# Patient Record
Sex: MALE | Race: WHITE | URBAN - METROPOLITAN AREA
[De-identification: names, ages, dates, MRNs, and addresses within clinical notes are randomized per-mention and may not be internally consistent; named-entity substitution may affect disease eponyms.]

---

## 2017-01-03 ENCOUNTER — APPOINTMENT (OUTPATIENT)
Dept: GENERAL RADIOLOGY | Facility: CLINIC | Age: 82
End: 2017-01-03
Attending: UROLOGY
Payer: MEDICARE

## 2017-01-03 ENCOUNTER — ANESTHESIA EVENT (OUTPATIENT)
Dept: SURGERY | Facility: CLINIC | Age: 82
End: 2017-01-03
Payer: MEDICARE

## 2017-01-03 ENCOUNTER — ANESTHESIA (OUTPATIENT)
Dept: SURGERY | Facility: CLINIC | Age: 82
End: 2017-01-03
Payer: MEDICARE

## 2017-01-03 PROBLEM — C66.9 URETERAL CANCER (H): Status: ACTIVE | Noted: 2017-01-03

## 2017-01-03 PROCEDURE — 25000125 ZZHC RX 250: Performed by: UROLOGY

## 2017-01-03 PROCEDURE — 25000128 H RX IP 250 OP 636: Performed by: NURSE ANESTHETIST, CERTIFIED REGISTERED

## 2017-01-03 PROCEDURE — C9399 UNCLASSIFIED DRUGS OR BIOLOG: HCPCS | Performed by: NURSE ANESTHETIST, CERTIFIED REGISTERED

## 2017-01-03 PROCEDURE — 40000277 XR SURGERY CARM FLUORO LESS THAN 5 MIN W STILLS: Mod: TC

## 2017-01-03 PROCEDURE — 25000125 ZZHC RX 250: Performed by: NURSE ANESTHETIST, CERTIFIED REGISTERED

## 2017-01-03 PROCEDURE — 25800025 ZZH RX 258: Performed by: NURSE ANESTHETIST, CERTIFIED REGISTERED

## 2017-01-03 PROCEDURE — P9041 ALBUMIN (HUMAN),5%, 50ML: HCPCS | Performed by: NURSE ANESTHETIST, CERTIFIED REGISTERED

## 2017-01-03 RX ORDER — PROPOFOL 10 MG/ML
INJECTION, EMULSION INTRAVENOUS PRN
Status: DISCONTINUED | OUTPATIENT
Start: 2017-01-03 | End: 2017-01-03

## 2017-01-03 RX ORDER — SODIUM CHLORIDE, SODIUM LACTATE, POTASSIUM CHLORIDE, CALCIUM CHLORIDE 600; 310; 30; 20 MG/100ML; MG/100ML; MG/100ML; MG/100ML
INJECTION, SOLUTION INTRAVENOUS CONTINUOUS PRN
Status: DISCONTINUED | OUTPATIENT
Start: 2017-01-03 | End: 2017-01-03

## 2017-01-03 RX ORDER — LIDOCAINE HYDROCHLORIDE 20 MG/ML
INJECTION, SOLUTION INFILTRATION; PERINEURAL PRN
Status: DISCONTINUED | OUTPATIENT
Start: 2017-01-03 | End: 2017-01-03

## 2017-01-03 RX ORDER — ALBUMIN, HUMAN INJ 5% 5 %
SOLUTION INTRAVENOUS CONTINUOUS PRN
Status: DISCONTINUED | OUTPATIENT
Start: 2017-01-03 | End: 2017-01-03

## 2017-01-03 RX ORDER — ONDANSETRON 2 MG/ML
INJECTION INTRAMUSCULAR; INTRAVENOUS PRN
Status: DISCONTINUED | OUTPATIENT
Start: 2017-01-03 | End: 2017-01-03

## 2017-01-03 RX ORDER — FENTANYL CITRATE 50 UG/ML
INJECTION, SOLUTION INTRAMUSCULAR; INTRAVENOUS PRN
Status: DISCONTINUED | OUTPATIENT
Start: 2017-01-03 | End: 2017-01-03

## 2017-01-03 RX ADMIN — LIDOCAINE HYDROCHLORIDE 90 MG: 20 INJECTION, SOLUTION INFILTRATION; PERINEURAL at 14:13

## 2017-01-03 RX ADMIN — SODIUM CHLORIDE, POTASSIUM CHLORIDE, SODIUM LACTATE AND CALCIUM CHLORIDE: 600; 310; 30; 20 INJECTION, SOLUTION INTRAVENOUS at 14:00

## 2017-01-03 RX ADMIN — PROPOFOL 10 MG: 10 INJECTION, EMULSION INTRAVENOUS at 18:02

## 2017-01-03 RX ADMIN — SODIUM CHLORIDE, POTASSIUM CHLORIDE, SODIUM LACTATE AND CALCIUM CHLORIDE: 600; 310; 30; 20 INJECTION, SOLUTION INTRAVENOUS at 14:15

## 2017-01-03 RX ADMIN — PROPOFOL 20 MG: 10 INJECTION, EMULSION INTRAVENOUS at 17:58

## 2017-01-03 RX ADMIN — FENTANYL CITRATE 150 MCG: 50 INJECTION, SOLUTION INTRAMUSCULAR; INTRAVENOUS at 14:13

## 2017-01-03 RX ADMIN — CEFAZOLIN SODIUM 2 G: 2 INJECTION, SOLUTION INTRAVENOUS at 14:39

## 2017-01-03 RX ADMIN — PROPOFOL 10 MG: 10 INJECTION, EMULSION INTRAVENOUS at 18:01

## 2017-01-03 RX ADMIN — SUGAMMADEX 140 MG: 100 INJECTION, SOLUTION INTRAVENOUS at 17:28

## 2017-01-03 RX ADMIN — ONDANSETRON 4 MG: 2 INJECTION INTRAMUSCULAR; INTRAVENOUS at 17:27

## 2017-01-03 RX ADMIN — FENTANYL CITRATE 50 MCG: 50 INJECTION, SOLUTION INTRAMUSCULAR; INTRAVENOUS at 17:00

## 2017-01-03 RX ADMIN — FENTANYL CITRATE 25 MCG: 50 INJECTION, SOLUTION INTRAMUSCULAR; INTRAVENOUS at 15:00

## 2017-01-03 RX ADMIN — PROPOFOL 140 MG: 10 INJECTION, EMULSION INTRAVENOUS at 14:13

## 2017-01-03 RX ADMIN — PROPOFOL 10 MG: 10 INJECTION, EMULSION INTRAVENOUS at 18:00

## 2017-01-03 RX ADMIN — ROCURONIUM BROMIDE 100 MG: 10 INJECTION INTRAVENOUS at 14:13

## 2017-01-03 RX ADMIN — PHENYLEPHRINE HYDROCHLORIDE 100 MCG: 10 INJECTION, SOLUTION INTRAMUSCULAR; INTRAVENOUS; SUBCUTANEOUS at 14:36

## 2017-01-03 RX ADMIN — PHENYLEPHRINE HYDROCHLORIDE 100 MCG: 10 INJECTION, SOLUTION INTRAMUSCULAR; INTRAVENOUS; SUBCUTANEOUS at 14:20

## 2017-01-03 RX ADMIN — ALBUMIN (HUMAN): 12.5 SOLUTION INTRAVENOUS at 14:20

## 2017-01-03 RX ADMIN — FENTANYL CITRATE 25 MCG: 50 INJECTION, SOLUTION INTRAMUSCULAR; INTRAVENOUS at 15:38

## 2017-01-03 ASSESSMENT — ENCOUNTER SYMPTOMS: DYSRHYTHMIAS: 1

## 2017-01-03 NOTE — ANESTHESIA PROCEDURE NOTES
Arterial Line Procedure Note  Staff:     Anesthesiologist:  CHANTALE STRICKLAND    Resident/CRNA:  ABENA CM    Arterial line performed by resident/CRNA in presence of a teaching physician    Location: In OR After Induction  Procedure Start/Stop Times:     patient identified, IV checked, site marked, risks and benefits discussed, informed consent, monitors and equipment checked, pre-op evaluation and at physician/surgeon's request      Correct Patient: Yes      Correct Position: Yes      Correct Site: Yes      Correct Procedure: Yes      Correct Laterality:  Yes    Site Marked:  N/A  Line Placement:     Procedure:  Arterial Line    Insertion Site:  Radial    Insertion laterality:  Right    Skin Prep: Chloraprep      Patient Prep: patient draped, mask, sterile gloves, hat and hand hygiene      Local skin infiltration:  None    Ultrasound Guided?: No      Catheter size:  20 gauge, Quick cath    Cath secured with: suture      Dressing:  Tegaderm    Complications:  None obvious    Arterial waveform: Yes      IBP within 10% of NIBP: Yes

## 2017-01-03 NOTE — ANESTHESIA PREPROCEDURE EVALUATION
Anesthesia Evaluation     . Pt has had prior anesthetic. Type: General    No history of anesthetic complications     ROS/MED HX    ENT/Pulmonary:  - neg pulmonary ROS     Neurologic:  - neg neurologic ROS     Cardiovascular:     (+) hypertension----. : . CHF etiology: diastolic dysfunction . HIGH, . :. dysrhythmias (paroxysmal AFib; not anticoagulated due to hematuria) .       METS/Exercise Tolerance:  >4 METS   Hematologic:         Musculoskeletal:         GI/Hepatic:  - neg GI/hepatic ROS       Renal/Genitourinary:     (+) chronic renal disease, type: CRI, Pt does not require dialysis,       Endo:     (+) type II DM Last HgA1c: 7.5 date: 7/2016 .      Psychiatric:  - neg psychiatric ROS       Infectious Disease:         Malignancy:   (+) Malignancy (ureteral cancer)           Other:               Physical Exam      Airway   Mallampati: II  TM distance: >3 FB  Neck ROM: limited    Dental   Comment: Edentulous on top    Cardiovascular   Rhythm and rate: irregular and normal      Pulmonary (+) decreased breath sounds                       Anesthesia Plan      History & Physical Review      ASA Status:  3 .    NPO Status:  > 6 hours    Plan for General and ETT with Intravenous and Propofol induction. Maintenance will be Balanced.    PONV prophylaxis:  Ondansetron (or other 5HT-3)  Additional equipment: 2nd IV and Arterial Line      Postoperative Care  Postoperative pain management:  IV analgesics and Multi-modal analgesia.      Consents  Anesthetic plan, risks, benefits and alternatives discussed with:  Patient..        82M with HTN, HLD, DM and diastolic dysfunction here for resection of ureteral cancer.    ASA 3.  Plan: GETA, modified RSI, PIV x2, art line for diastolic dysfunction.    Solange Blanco MD  Staff Anesthesiologist  Pager [phone]

## 2017-01-04 NOTE — ANESTHESIA POSTPROCEDURE EVALUATION
Patient: Osmani Mckay    DAVINCI URETERECTOMY (Right Abdomen)  COMBINED CYSTOSCOPY, RETROGRADES, URETEROSCOPY, INSERT STENT (Left Urethra)  Additional InformationProcedure(s):  DaVinci Assisted Right pelvic lymphadenectomy, Cystoscopy, Left ureteral washings, Left pyelogram retrograde, Right diagnostic ureteroscopy, Right ureteral stent removal, right stent placement, Cystogram *Latex Allergy* - Wound Class: II-Clean Contaminated   - Wound Class: II-Clean Contaminated    Diagnosis:Urothelial Cancer   Diagnosis Additional Information: No value filed.    Anesthesia Type:  General, ETT    Note:  Anesthesia Post Evaluation    Patient location during evaluation: PACU  Patient participation: Able to fully participate in evaluation  Level of consciousness: awake  Pain management: adequate  Airway patency: patent  Cardiovascular status: hemodynamically stable  Respiratory status: room air  Hydration status: euvolemic  PONV: none             Last vitals:  Filed Vitals:    01/03/17 1900 01/03/17 1915 01/03/17 1930   BP: 121/92 114/77 113/65   Temp: 36.6  C (97.8  F)  36.9  C (98.4  F)   Resp: 16 16 16   SpO2: 100% 100% 100%       Electronically Signed By: Tc Dubois MD  January 3, 2017  7:39 PM

## 2017-01-04 NOTE — ANESTHESIA CARE TRANSFER NOTE
Patient: Osmani Mckay    DAVINCI URETERECTOMY (Right Abdomen)  COMBINED CYSTOSCOPY, RETROGRADES, URETEROSCOPY, INSERT STENT (Left Urethra)  Additional InformationProcedure(s):  DaVinci Assisted Right pelvic lymphadenectomy, Cystoscopy, Left ureteral washings, Left pyelogram retrograde, Right diagnostic ureteroscopy, Right ureteral stent removal, right stent placement, Cystogram *Latex Allergy* - Wound Class: II-Clean Contaminated   - Wound Class: II-Clean Contaminated    Diagnosis: Urothelial Cancer   Diagnosis Additional Information: No value filed.    Anesthesia Type:   General, ETT     Note:  Airway :Face Mask  Patient transferred to:PACU  Comments: Sleepy, awakens to verbal stimuli. Stable, report to RN.      Vitals: (Last set prior to Anesthesia Care Transfer)              Electronically Signed By: JEANE Smith CRNA  January 3, 2017  6:22 PM

## 2017-01-05 ENCOUNTER — CARE COORDINATION (OUTPATIENT)
Dept: CARE COORDINATION | Facility: CLINIC | Age: 82
End: 2017-01-05

## 2017-01-05 NOTE — PROGRESS NOTES
"[facility]  \"Hello, my name is Marisol Gamez , and I am calling from the [facility].  I want to check in and see how you are doing, after leaving the hospital.  You will also receive a call from your Care Coordinator (care team), but I want to make sure you don t have any urgent needs.  I have a couple questions to review with you:     Post-Discharge Outreach                                                    Osmani Mckay is a 82 year old male     Date of Admission:                 1/3/2017    Date of Discharge::                 1/5/2017  Admitting Physician:               Eugenio Francisco MD  Discharge Physician:              Divine Bauer PA-C              Admission Diagnoses:    Urothelial Cancer        Past Medical History     Past Medical History    Diagnosis  Date       Arrhythmia          A-FIB       Chronic diastolic CHF (congestive heart failure) (H)         Urethral carcinoma (H)         Hypertension         Numbness and tingling          PERIPHERAL NEUROPATHY       Glucose intolerance (impaired glucose tolerance)         Renal disease          CHRONIC, STAGE III, GFR 30-59 ML/MIN       Peripheral vascular disease (H)         Arthritis          OSTEOARTHRITIS       Melanoma (H)         High cholesterol         Spinal stenosis         BPH (benign prostatic hyperplasia)         Diabetes (H)                    Discharge Diagnosis:    Urothelial Cancer                Follow-up Appointments      Adult [facility] Follow-up and recommended labs and tests        Follow-Up:    - Call your primary care provider to touch base regarding your recent admission.     - Follow up with Dr. Francisco as scheduled on 1/19/16 to review pathology and discuss plan.    - Call or return sooner than your regularly scheduled visit if you develop any of the following:  Fever (greater than 101.3F) or flu-like symptoms, uncontrolled pain, uncontrolled nausea or vomiting, as well as " increased redness, swelling, or drainage from your wound.     Phone numbers:  - Nursing phone helpline at the Urology Clinic (8A-5P M-F):  [phone].     - Nights or weekends, call [phone] and ask the  to page the urology resident on call.    - For emergencies, always call 911        Appointments on [place] and/or [facility] (with [facility] or [facility] provider or service). Call [phone] if you haven't heard regarding these appointments within 7 days of discharge.                       Your next 10 appointments already scheduled      Jan 19, 2017  1:45 PM   (Arrive by 1:30 PM)   Post-Op with Eugenio Francisco MD   [facility] and [facility] ([facility])               Care Team:    Patient Care Team       Relationship Specialty Notifications Start Kenan Dixon PCP - General Internal Medicine  12/27/16     Phone: [phone] Fax: [phone]         [facility] [address] [facility] [address]    Eugenio Francisco MD MD Urology  11/29/16     Phone: [phone] Fax: [phone]         [address] [address]    Chani Manzanares RN Registered Nurse Urology  11/29/16     Phone: [phone] Pager: [phone]        Crow Rivera MD Referring Physician   11/29/16             Transition of Care Review                                                      Did you have a surgery or procedure during your hospital visit? Yes   If yes, do you have any of the following:     Signs of infection:  No    Pain:  No     Pain Scale (0-10) 0/10     Location: NA    Wound/incision concerns? NO    Do you have all of your medications/refills?  Yes    Are you having any side effects or questions about your medication(s)? No    Do you have any new or worsening symptoms?  No    Do you have any future appointments scheduled?   Yes  1/19/17             Plan                                                       Thanks for your time.  Your Care Coordinator will follow-up within the next couple days.  In the meantime if you have questions, concerns or problems call your care team.        Marisol Gamez

## 2017-01-09 ENCOUNTER — PRE VISIT (OUTPATIENT)
Dept: UROLOGY | Facility: CLINIC | Age: 82
End: 2017-01-09

## 2017-01-19 ENCOUNTER — OFFICE VISIT (OUTPATIENT)
Dept: UROLOGY | Facility: CLINIC | Age: 82
End: 2017-01-19

## 2017-01-19 VITALS
SYSTOLIC BLOOD PRESSURE: 115 MMHG | WEIGHT: 151 LBS | HEIGHT: 68 IN | DIASTOLIC BLOOD PRESSURE: 69 MMHG | HEART RATE: 71 BPM | BODY MASS INDEX: 22.88 KG/M2

## 2017-01-19 DIAGNOSIS — C66.1 MALIGNANT TUMOR OF RIGHT URETER (H): ICD-10-CM

## 2017-01-19 DIAGNOSIS — C67.8 MALIGNANT NEOPLASM OF OVERLAPPING SITES OF BLADDER (H): ICD-10-CM

## 2017-01-19 DIAGNOSIS — C68.9 UROTHELIAL CANCER (H): Primary | ICD-10-CM

## 2017-01-19 ASSESSMENT — PAIN SCALES - GENERAL: PAINLEVEL: NO PAIN (0)

## 2017-01-19 NOTE — PROGRESS NOTES
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     The surgery was aborted due to pathologic evidence of lymph node disease. Left ureteral washings were negative.     BLADDER  Date of Initial Diagnosis:  10/2016  Stage of Initial Diagnosis: CIS  Grade at Initial Diagnosis: high  Site of First Diagnosis: bladder    Any Recurrence?: none    Intravesical Treatments: TURBT  Date of Last Cysto: 2017 with no evidence of disease   Date of Last Upper Tract Imagin/3/2017 (retrograde pyelogram)    Right Distal Ureter  Date of Initial Diagnosis:  10/2016  Stage of Initial Diagnosis: High grade possibly invasive  Grade at Initial Diagnosis: High  Site of First Diagnosis: Right distal ureter, unknown if upper tract okay  Any Recurrence?: n/a  Intravesical Treatments: biopsy and fulgaration  Date of Last Cysto: 2017  Date of Last Upper Tract Imagin/3/2017 (retrograde pyelogram)     Left  Ureteral washings  Negative on repeat washing, and normal retrograde (still at risk)    CR     2.03   2017  CR     2.03   2017  CR     2.05   1/3/2017  CR     2.16   1/3/2017      Assessment: History of Urothelial Cancer stage Right distal ureter: pT?(likely 3) N2Mx    Plan:  - Will see Dr. Diaz in 3 months for right ureteral stent exchange.  - Follow-up with Medical oncology for discussion of chemotherapy options.       Given his locally advanced disease and the significant risk of need for dialysis if we removed his kidney, we elected to not proceed with distal uretectomy and or Neph U.    Eugenio Francisco MD  Department of Urology [facility]

## 2017-01-19 NOTE — Clinical Note
2017       RE: Osmani Mckay  [address] [place] [id number]     Dear Colleague,    Thank you for referring your patient, Osmani Mckay, to the [facility] AND INST FOR PROSTATE AND UROLOGIC CANCERS at [facility]. Please see a copy of my visit note below.    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     The surgery was aborted due to pathologic evidence of lymph node disease. Left ureteral washings were negative.     BLADDER  Date of Initial Diagnosis:  10/2016  Stage of Initial Diagnosis: CIS  Grade at Initial Diagnosis: high  Site of First Diagnosis: bladder    Any Recurrence?: none    Intravesical Treatments: TURBT  Date of Last Cysto: 2017 with no evidence of disease   Date of Last Upper Tract Imagin/3/2017 (retrograde pyelogram)    Right Distal Ureter  Date of Initial Diagnosis:  10/2016  Stage of Initial Diagnosis: High grade possibly invasive  Grade at Initial Diagnosis: High  Site of First Diagnosis: Right distal ureter, unknown if upper tract okay  Any Recurrence?: n/a  Intravesical Treatments: biopsy and fulgaration  Date of Last Cysto: 2017  Date of Last Upper Tract Imagin/3/2017 (retrograde pyelogram)     Left  Ureteral washings  Negative on repeat washing, and normal retrograde (still at risk)    CR     2.03   2017  CR     2.03   2017  CR     2.05   1/3/2017  CR     2.16   1/3/2017      Assessment: History of Urothelial Cancer stage Right distal ureter: pT?(likely 3) N2Mx    Plan:  - Will see Dr. Diaz in 3 months for right ureteral stent exchange.  - Follow-up with Medical oncology for discussion of chemotherapy options.       Given his locally advanced disease and the significant risk of need for dialysis if we removed his kidney, we elected  to not proceed with distal uretectomy and or Neph U.    Eugenio Francisco MD  Department of Urology Staff  [facility]

## 2017-01-19 NOTE — NURSING NOTE
Chief Complaint   Patient presents with     Surgical Followup     Post op.  S/P ureterectomy on 1-3-17.  Discuss path results       Ro Herring MA

## 2020-03-10 ENCOUNTER — HEALTH MAINTENANCE LETTER (OUTPATIENT)
Age: 85
End: 2020-03-10

## 2020-12-27 ENCOUNTER — HEALTH MAINTENANCE LETTER (OUTPATIENT)
Age: 85
End: 2020-12-27

## 2021-04-24 ENCOUNTER — HEALTH MAINTENANCE LETTER (OUTPATIENT)
Age: 86
End: 2021-04-24

## 2021-10-09 ENCOUNTER — HEALTH MAINTENANCE LETTER (OUTPATIENT)
Age: 86
End: 2021-10-09

## 2022-05-16 ENCOUNTER — HEALTH MAINTENANCE LETTER (OUTPATIENT)
Age: 87
End: 2022-05-16

## 2022-09-11 ENCOUNTER — HEALTH MAINTENANCE LETTER (OUTPATIENT)
Age: 87
End: 2022-09-11

## 2023-06-03 ENCOUNTER — HEALTH MAINTENANCE LETTER (OUTPATIENT)
Age: 88
End: 2023-06-03